# Patient Record
Sex: FEMALE | Race: BLACK OR AFRICAN AMERICAN
[De-identification: names, ages, dates, MRNs, and addresses within clinical notes are randomized per-mention and may not be internally consistent; named-entity substitution may affect disease eponyms.]

---

## 2017-08-24 ENCOUNTER — HOSPITAL ENCOUNTER (EMERGENCY)
Dept: HOSPITAL 61 - ER | Age: 52
Discharge: HOME | End: 2017-08-24
Payer: SELF-PAY

## 2017-08-24 VITALS — WEIGHT: 200 LBS | BODY MASS INDEX: 31.39 KG/M2 | HEIGHT: 67 IN

## 2017-08-24 VITALS — SYSTOLIC BLOOD PRESSURE: 163 MMHG | DIASTOLIC BLOOD PRESSURE: 94 MMHG

## 2017-08-24 DIAGNOSIS — M54.41: ICD-10-CM

## 2017-08-24 DIAGNOSIS — F12.10: ICD-10-CM

## 2017-08-24 DIAGNOSIS — M54.42: Primary | ICD-10-CM

## 2017-08-24 PROCEDURE — 99284 EMERGENCY DEPT VISIT MOD MDM: CPT

## 2017-08-24 NOTE — PHYS DOC
Past Medical History


Past Medical History:  No Pertinent History


Past Surgical History:  No Surgical History


Alcohol Use:  Occasionally


Drug Use:  Marijuana





Adult General


Chief Complaint


Chief Complaint:  BACK PAIN - NO INJURY





HPI


HPI





Patient is a 51  year old female presenting with moderate bilateral low back 

pain that began this morning. Patient denies any known injury. She states the 

pain is radiating to bilateral hips. Patient denies any loss of bowel/ bladder 

function. Denies any numbness or tingling to bilateral lower extremities. 

Denies any urinary symptoms. Patient states she has no PCP, she also states she 

has no previous medical history.





Review of Systems


Review of Systems





Constitutional: Denies fever or chills []


GI: Denies abdominal pain, nausea, vomiting, bloody stools or diarrhea []


: Denies dysuria or hematuria []


Musculoskeletal: Low back pain


Integument: Denies rash or skin lesions []


Neurologic: Denies headache, focal weakness or sensory changes []





Current Medications


Current Medications





Current Medications








 Medications


  (Trade)  Dose


 Ordered  Sig/Dany  Start Time


 Stop Time Status Last Admin


Dose Admin


 


 Acetaminophen/


 Hydrocodone Bitart


  (Lortab 5/325)  2 tab  1X  ONCE  8/24/17 18:45


 8/24/17 18:46   


 


 


 Cyclobenzaprine


 HCl


  (Flexeril)  10 mg  1X  ONCE  8/24/17 18:45


 8/24/17 18:46   


 


 


 Naproxen


  (Naprosyn)  500 mg  1X  STAT  8/24/17 18:01


 8/24/17 18:19 DC  


 











Allergies


Allergies





Allergies








Coded Allergies Type Severity Reaction Last Updated Verified


 


  No Known Drug Allergies    8/24/17 No











Physical Exam


Physical Exam





Constitutional: Well developed, well nourished, no acute distress, non-toxic 

appearance. []


Abdomen: Bowel sounds normal, soft, no tenderness, no masses, no pulsatile 

masses. [] 


Skin: Warm, dry, no erythema, no rash. [] 


Back: No tenderness, no CVA tenderness. [] 


Extremities: Diffuse paraspinal muscle tenderness to bilateral lumbar spine, no 

midline lumbar spine tenderness, no cyanosis, no clubbing, ROM intact, no 

edema. [] 


Neurologic: Alert and oriented X 3, normal motor function, normal sensory 

function, no focal deficits noted. []


Psychologic: Affect normal, judgement normal, mood normal. []





Current Patient Data


Vital Signs





 Vital Signs








  Date Time  Temp Pulse Resp B/P (MAP) Pulse Ox O2 Delivery O2 Flow Rate FiO2


 


8/24/17 17:49 97.6 70 18  97 Room Air  





 97.6       











EKG


EKG


[]





Radiology/Procedures


Radiology/Procedures


[]





Course & Med Decision Making


Course & Med Decision Making


Pertinent Labs and Imaging studies reviewed. (See chart for details)





Patient is in the ED with complaints of bilateral low back pain that began today

, no known injury. Pain appears musculoskeletal. Discharged with Ultram and 

Robaxin. Follow-up with PCP in 1-2 weeks from the list provided. She has no 

symptoms Cauda equina syndrome.





Dragon Disclaimer


Dragon Disclaimer


This electronic medical record was generated, in whole or in part, using a 

voice recognition dictation system.





Departure


Departure


Impression:  


 Primary Impression:  


 Back pain


 Additional Impressions:  


 Sciatica of right side


 Sciatica of left side


Disposition:  01 HOME, SELF-CARE


Condition:  STABLE


Referrals:  


NO PCP (PCP)


follow up with a doctor from the list provided in one week


Patient Instructions:  Back Pain, Adult, Sciatica, Easy-to-Read





Additional Instructions:  


You were seen for low back pain. Apply heat to your low back. Establish care 

with a doctor from the list provided and follow-up in the next 7 days. Avoid 

lifting any heavy items especially anything greater than a gallon of milk for 

one week. Take the prescribed medicines as ordered. Do not drive or operate 

machinery on any of the pain medications.


Scripts


Tramadol Hcl (ULTRAM) 50 Mg Tablet


1 TAB PO Q6HRS, #30 TAB


   Prov: PAVAN WEST         8/24/17 


Methocarbamol (ROBAXIN) 500 Mg Tablet


1 TAB PO TID, #30 TAB


   Prov: PAVAN WEST         8/24/17





Problem Qualifiers








 Primary Impression:  


 Back pain


 Back pain location:  low back pain  Chronicity:  acute  Back pain laterality:  

bilateral  Sciatica presence:  with sciatica  Sciatica laterality:  bilateral 

sciatica  Qualified Codes:  M54.42 - Lumbago with sciatica, left side; M54.41 - 

Lumbago with sciatica, right side








PAVAN WEST Aug 24, 2017 18:12

## 2018-01-22 ENCOUNTER — HOSPITAL ENCOUNTER (EMERGENCY)
Dept: HOSPITAL 61 - ER | Age: 53
Discharge: HOME | End: 2018-01-22
Payer: SELF-PAY
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
## 2018-01-22 DIAGNOSIS — F12.10: ICD-10-CM
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
## 2018-01-22 DIAGNOSIS — S89.91XA: Primary | ICD-10-CM

## 2018-01-22 PROCEDURE — 29505 APPLICATION LONG LEG SPLINT: CPT

## 2018-01-22 PROCEDURE — 99284 EMERGENCY DEPT VISIT MOD MDM: CPT

## 2018-01-22 PROCEDURE — 73562 X-RAY EXAM OF KNEE 3: CPT

## 2018-01-22 RX ADMIN — ACETAMINOPHEN 1 MG: 325 TABLET, FILM COATED ORAL at 20:51

## 2018-09-23 ENCOUNTER — HOSPITAL ENCOUNTER (EMERGENCY)
Dept: HOSPITAL 61 - ER | Age: 53
Discharge: HOME | End: 2018-09-23
Payer: SELF-PAY

## 2018-09-23 VITALS — HEIGHT: 66 IN | WEIGHT: 200 LBS | BODY MASS INDEX: 32.14 KG/M2

## 2018-09-23 VITALS — SYSTOLIC BLOOD PRESSURE: 142 MMHG | DIASTOLIC BLOOD PRESSURE: 80 MMHG
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
## 2018-09-23 DIAGNOSIS — S61.217A: Primary | ICD-10-CM
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
## 2018-09-23 PROCEDURE — 99282 EMERGENCY DEPT VISIT SF MDM: CPT

## 2018-09-23 PROCEDURE — 99283 EMERGENCY DEPT VISIT LOW MDM: CPT

## 2018-09-23 PROCEDURE — 12001 RPR S/N/AX/GEN/TRNK 2.5CM/<: CPT

## 2020-11-27 ENCOUNTER — HOSPITAL ENCOUNTER (EMERGENCY)
Dept: HOSPITAL 61 - ER | Age: 55
Discharge: HOME | End: 2020-11-27
Payer: SELF-PAY

## 2020-11-27 VITALS — SYSTOLIC BLOOD PRESSURE: 138 MMHG | DIASTOLIC BLOOD PRESSURE: 69 MMHG

## 2020-11-27 VITALS — WEIGHT: 200.4 LBS | HEIGHT: 66 IN | BODY MASS INDEX: 32.21 KG/M2
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
## 2020-11-27 DIAGNOSIS — S52.515A: Primary | ICD-10-CM

## 2020-11-27 DIAGNOSIS — F17.200: ICD-10-CM

## 2020-11-27 DIAGNOSIS — S52.615A: ICD-10-CM

## 2020-11-27 PROCEDURE — 73110 X-RAY EXAM OF WRIST: CPT

## 2020-11-27 PROCEDURE — 29125 APPL SHORT ARM SPLINT STATIC: CPT

## 2020-11-27 PROCEDURE — A4565 SLINGS: HCPCS

## 2020-11-27 PROCEDURE — 99284 EMERGENCY DEPT VISIT MOD MDM: CPT

## 2020-11-27 NOTE — PHYS DOC
Past Medical History


Past Medical History:  No Pertinent History


Past Surgical History:  No Surgical History


Smoking Status:  Current Every Day Smoker


Alcohol Use:  Occasionally


Drug Use:  Marijuana





General Adult


EDM:


Chief Complaint:  WRIST PAIN





HPI:


HPI:





Patient is a 55  year old female who presents with tripped and fell try to catch

herself with the left wrist 6 days ago.  She states since then she has had left 

wrist pain and increased swelling.  She states her only history of smoking.  

States she been taking Tylenol and ibuprofen for this.  Rates her pain an 8 out 

of 10 notes sharp and aching.





Review of Systems:


Review of Systems:


Constitutional:   Denies fever or chills. []


Eyes:   Denies change in visual acuity. []


HENT:   Denies nasal congestion or sore throat. [] 


Respiratory:   Denies cough or shortness of breath. [] 


Cardiovascular:   Denies chest pain or + left wrist 3+ edema. [] 


GI:   Denies abdominal pain, nausea, vomiting, bloody stools or diarrhea. [] 


:  Denies dysuria. [] 


Musculoskeletal:   Denies back pain.  +Left wrist joint pain. [] 


Integument:   Denies rash. [] 


Neurologic:   Denies headache, focal weakness or sensory changes. [] 


Endocrine:   Denies polyuria or polydipsia. [] 


Lymphatic:  Denies swollen glands. [] 


Psychiatric:  Denies depression or anxiety. []





Heart Score:


Risk Factors:


Risk Factors:  DM, Current or recent (<one month) smoker, HTN, HLP, family 

history of CAD, obesity.


Risk Scores:


Score 0 - 3:  2.5% MACE over next 6 weeks - Discharge Home


Score 4 - 6:  20.3% MACE over next 6 weeks - Admit for Clinical Observation


Score 7 - 10:  72.7% MACE over next 6 weeks - Early Invasive Strategies





Allergies:


Allergies:





Allergies








Coded Allergies Type Severity Reaction Last Updated Verified


 


  No Known Drug Allergies    17 No











Physical Exam:


PE:





Constitutional: Well developed, well nourished, no acute distress, non-toxic 

appearance. []


HENT: Normocephalic, atraumatic, bilateral external ears normal, oropharynx 

moist, no oral exudates, nose normal. []


Eyes: PERRLA, EOMI, conjunctiva normal, no discharge. [] 


Neck: Normal range of motion, no tenderness, supple, no stridor. [] 


Cardiovascular:Heart rate regular rhythm, no murmur []


Lungs & Thorax:  Bilateral breath sounds clear to auscultation []


Abdomen: Bowel sounds normal, soft, no tenderness, no masses, no pulsatile 

masses. [] 


Skin: Warm, dry, no erythema, no rash. [] 


Back: No tenderness, no CVA tenderness. [] 


Extremities: Left lateral, medial, posterior, anterior tenderness, no cyanosis, 

no clubbing, left wrist ROM mid intact, 2-3+ edema. [] 


Neurologic: Alert and oriented X 3, normal motor function, normal sensory 

function, no focal deficits noted. []


Psychologic: Affect normal, judgement normal, mood normal. []





Current Patient Data:


Vital Signs:





                                   Vital Signs








  Date Time  Temp Pulse Resp B/P (MAP) Pulse Ox O2 Delivery O2 Flow Rate FiO2


 


20 15:39 97.9 80 16 138/69 (92) 100 Room Air  





 97.9       











EKG:


EKG:


[]





Radiology/Procedures:


Radiology/Procedures:


[]


Impression:


                            [facility]


                    [address] [place] [address]


                                 [phone]


                                        


                                 IMAGING REPORT





                                     Signed





PATIENT: DA HINES   ACCOUNT: [id number]     MRN#: [id number]


: 1965           LOCATION: ER              AGE: 55


SEX: F                    EXAM DT: 20         ACCESSION#: [id number]


STATUS: REG ER            ORD. PHYSICIAN: JOE SHEA


REASON: pain and swelling after a fall


PROCEDURE: WRIST 3V LEFT





 


WRIST 3V LEFT


 


Clinical Indication: Reason: pain and swelling after a fall / Spl. 


Instructions:  / History: 


 


Comparison: None.


 


Findings: 


There is acute traumatic nondisplaced fracture of the radial styloid. 


There is acute traumatic nondisplaced fracture at the tip of the ulnar 


styloid. The carpal bones are intact. The joint spaces are maintained. 


There is mild dorsal soft tissue swelling of the wrist. No radiopaque 


foreign body.


 


IMPRESSION:


Acute traumatic nondisplaced fractures of the radial and ulnar styloids.


 


Electronically signed by: Saran Carbone MD (2020 4:45 PM) [facility]














DICTATED and SIGNED BY:     SARAN CARBONE MD


DATE:     20 [id number]





Course & Med Decision Making:


Course & Med Decision Making


Pertinent Labs and Imaging studies reviewed. (See chart for details)





See HPI.  Limited range of motion in the left wrist due to pain and swelling.  

Tenderness to the medial, lateral, posterior, anterior wrist.  Patient can make 

a light fist.  She can wiggle her fingers.  Denies any numbness or tingling or 

skin color changes.  There is no bruising seen.  Cap refills less than 2 seco

nds.  Radial pulses strong and present.  Wrist is 2-3+ minutes.





IMPRESSION:


Acute traumatic nondisplaced fractures of the radial and ulnar styloids.





Patient to be placed in a sugar tong and follow-up with orthopedics.





***Splint assessment: Neurovascularly intact post splint replacement with good 

fit.





Patient's extremity symptoms have stabilized well they have been evaluated in 

the department and are appropriate for outpatient follow-up.  No evidence of 

compartment syndrome, neurologic injury, vascular injury, open joint, open 

fracture, tendon laceration, or foreign body.











[]





Dragon Disclaimer:


Dragon Disclaimer:


This electronic medical record was generated, in whole or in part, using a voice

 recognition dictation system.





Departure


Departure


Impression:  


   Primary Impression:  


   Radial styloid fracture


   Qualified Codes:  S52.516A - Nondisplaced fracture of unspecified radial 

   styloid process, initial encounter for closed fracture


   Additional Impression:  


   Fracture of styloid process of left ulna


   Qualified Codes:  S52.615A - Nondisplaced fracture of left ulna styloid 

   process, initial encounter for closed fracture


Disposition:  01 DC HOME SELF CARE/HOMELESS


Condition:  STABLE


Referrals:  


NO PCP (PCP)








JOSÉ ANTONIO HARMON MD


Patient Instructions:  Radial Fracture, Ulnar Fracture





Additional Instructions:  


Follow-up with orthopedic as soon as possible.  Take medication as prescribed 

earlier and the medication will make you sleepy.  Do not drive or drink alcohol 

with the medication.  Do not take more Tylenol with this medication.  Use ice 

and elevation to help with pain.


Scripts


Hydrocodone/Apap 5-325 (NORCO 5-325 TABLET) 1 Each Tablet


1 TAB PO PRN Q6HRS PRN for PAIN, #12 TAB 0 Refills


   Prov: JOE SHEA         20











JOE SHEA            2020 16:58

## 2020-11-27 NOTE — RAD
WRIST 3V LEFT

 

Clinical Indication: Reason: pain and swelling after a fall / Spl. 

Instructions:  / History: 

 

Comparison: None.

 

Findings: 

There is acute traumatic nondisplaced fracture of the radial styloid. 

There is acute traumatic nondisplaced fracture at the tip of the ulnar 

styloid. The carpal bones are intact. The joint spaces are maintained. 

There is mild dorsal soft tissue swelling of the wrist. No radiopaque 

foreign body.

 

IMPRESSION:

Acute traumatic nondisplaced fractures of the radial and ulnar styloids.

 

Electronically signed by: Saran Carbone MD (11/27/2020 4:45 PM) [place]

## 2021-04-06 ENCOUNTER — HOSPITAL ENCOUNTER (EMERGENCY)
Dept: HOSPITAL 61 - ER | Age: 56
Discharge: HOME | End: 2021-04-06
Payer: SELF-PAY

## 2021-04-06 VITALS — SYSTOLIC BLOOD PRESSURE: 166 MMHG | DIASTOLIC BLOOD PRESSURE: 81 MMHG

## 2021-04-06 VITALS — WEIGHT: 189.6 LBS | HEIGHT: 66 IN | BODY MASS INDEX: 30.47 KG/M2

## 2021-04-06 DIAGNOSIS — Z20.822: ICD-10-CM

## 2021-04-06 DIAGNOSIS — R05: ICD-10-CM

## 2021-04-06 DIAGNOSIS — R09.89: Primary | ICD-10-CM

## 2021-04-06 DIAGNOSIS — R06.02: ICD-10-CM

## 2021-04-06 DIAGNOSIS — F17.200: ICD-10-CM

## 2021-04-06 PROCEDURE — 93005 ELECTROCARDIOGRAM TRACING: CPT

## 2021-04-06 PROCEDURE — 71045 X-RAY EXAM CHEST 1 VIEW: CPT

## 2021-04-06 PROCEDURE — C9803 HOPD COVID-19 SPEC COLLECT: HCPCS

## 2021-04-06 PROCEDURE — 99285 EMERGENCY DEPT VISIT HI MDM: CPT

## 2021-04-06 PROCEDURE — U0003 INFECTIOUS AGENT DETECTION BY NUCLEIC ACID (DNA OR RNA); SEVERE ACUTE RESPIRATORY SYNDROME CORONAVIRUS 2 (SARS-COV-2) (CORONAVIRUS DISEASE [COVID-19]), AMPLIFIED PROBE TECHNIQUE, MAKING USE OF HIGH THROUGHPUT TECHNOLOGIES AS DESCRIBED BY CMS-2020-01-R: HCPCS

## 2021-04-06 NOTE — RAD
XR CHEST 1V



Clinical History: Reason: SOA, cough, chest pressure / Spl. Instructions:  / History: 



Technique:  AP view of the chest was obtained at 4/6/2021 5:47 PM.



Comparison: None.



Findings:

The cardiomediastinal silhouette is normal. The pulmonary vasculature is normal. The lungs and pleura
l margins are clear.



Impression:  



No evidence of an acute cardiopulmonary process.



Electronically signed by: Rick Avila III, MD (4/6/2021 5:54 PM) [facility]

## 2021-04-06 NOTE — ED.ADGEN
Past Medical History


Past Medical History:  No Pertinent History


Past Surgical History:  No Surgical History


Smoking Status:  Current Every Day Smoker


Alcohol Use:  Occasionally


Drug Use:  Marijuana





General Adult


EDM:


Chief Complaint:  SHORTNESS OF BREATH





HPI:


HPI:





Patient is a 55  year old AA female who presents to the emergency department 

with complaints of a dry cough, shortness of breath, and chest congestion since 

yesterday.  She states she is also had some runny nose and nasal congestion.  

She denies any fever, sore throat, nausea, vomiting, diarrhea, body aches, or 

dizziness.  Patient states she has felt generalized fatigue.  She denies any 

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 

have been exposed to someone with COVID-19.  She denies any loss of taste or 

smell.  Patient currently rates her discomfort 8 out of 10 on the pain scale she

describes it as a chest tightness.  She denies any alleviating or exacerbating 

factors.  She denies any significant medical history.





Review of Systems:


Review of Systems:


Complete ROS is negative unless otherwise noted in HPI.





Allergies:


Allergies:





Allergies








Coded Allergies Type Severity Reaction Last Updated Verified


 


  No Known Drug Allergies    17 No











Physical Exam:


PE:


See Above


Constitutional: Well developed, well nourished, no acute distress, non-toxic 

appearance, overweight. []


HENT: Normocephalic, atraumatic, bilateral external ears normal, nose normal. []


Eyes: PERRLA, EOMI, conjunctiva normal, no discharge. [] 


Neck: Normal range of motion, no stridor. [] 


Cardiovascular:Heart rate regular rhythm


Lungs & Thorax:  Respirations even and unlabored, no retractions, no respiratory

 distress, lungs CTA with occasional expiratory wheeze


Abdomen: soft, no tenderness


Skin: Warm, dry, no erythema, no rash. [] 


Extremities: No cyanosis, ROM intact, no edema. [] 


Neurologic: Alert and oriented X 3, normal motor, sensory, no focal deficits 

noted. []


Psychologic: Affect normal, judgement normal, mood normal. []





Current Patient Data:


Vital Signs:





                                   Vital Signs








  Date Time  Temp Pulse Resp B/P (MAP) Pulse Ox O2 Delivery O2 Flow Rate FiO2


 


21 16:55 98.4 87 16 159/79 (105) 97 Room Air  





 98.4       











EKG:


EK-sinus rhythm, EKG, rate 84, no STEMI, read by Dr. Proctor []





Heart Score:


C/O Chest Pain:  No


HEART Score for Chest Pain:  








HEART Score for Chest Pain Response (Comments) Value


 


History Slighlty/Non-Suspicious 0


 


ECG Normal 0


 


Age >45 - < 65 1


 


Risk Factors                            1 or 2 Risk Factors 1


 


Total  2








Risk Factors:


Risk Factors:  DM, Current or recent (<one month) smoker, HTN, HLP, family 

history of CAD, obesity.


Risk Scores:


Score 0 - 3:  2.5% MACE over next 6 weeks - Discharge Home


Score 4 - 6:  20.3% MACE over next 6 weeks - Admit for Clinical Observation


Score 7 - 10:  72.7% MACE over next 6 weeks - Early Invasive Strategies





Radiology/Procedures:


Radiology/Procedures:


PROCEDURE: PORTABLE CHEST 1V





XR CHEST 1V





Clinical History: Reason: SOA, cough, chest pressure / Spl. Instructions:  / H

istory: 





Technique:  AP view of the chest was obtained at 2021 5:47 PM.





Comparison: None.





Findings:


The cardiomediastinal silhouette is normal. The pulmonary vasculature is normal.

 The lungs and pleural margins are clear.





Impression:  





No evidence of an acute cardiopulmonary process.


[]





Course & Med Decision Making:


Course & Med Decision Making


Pertinent Labs and Imaging studies reviewed. (See chart for details)


55-year-old female presents emergency department with complaints of shortness of

 breath, chest congestion, and concerns of COVID-19 infection.





Chest x-ray reveals no acute findings.  EKG is normal with no acute changes.  

Patient's vital signs are stable in the emergency department, COVID-19 test is 

pending.  Patient was given COVID-19 quarantine and care instructions





I encouraged patient to return to the ER if her symptoms worsened or fever 

develop that did not respond to Tylenol or ibuprofen.





Patient verbalized an understanding of home care, medications, follow-up, and 

return to ED instructions and was in agreement with the plan of care.





Dragon Disclaimer:


Dragon Disclaimer:


This electronic medical record was generated, in whole or in part, using a voice

 recognition dictation system.





Departure


Departure


Impression:  


   Primary Impression:  


   Person under investigation for COVID-19


   Additional Impression:  


   Chest congestion


Disposition:  01 DC HOME SELF CARE/HOMELESS


Condition:  STABLE


Referrals:  


NO PCP (PCP)


Patient Instructions:  Upper Respiratory Infection, Adult, Easy-to-Read





Additional Instructions:  


You have been tested for or diagnosed with COVID-19. It is an infection caused 

by a new type 


of coronavirus. COVID-19 will cause cold-like or mild flu symptoms in most. It 

can cause 


more severe symptoms like problems breathing in some.





There is no treatment for COVID-19. The body will clear the infection over time.

 Self-care 


will help to ease discomfort.





Steps to Take:


Self-Care


Rest as needed. Healthy habits may help you feel better. Steps include:





Choose healthy foods including fruits and vegetables. Drink water throughout the

 day.


Get plenty of sleep each night.


If you smoke, try to quit. It may ease breathing.


Avoid alcohol.


Keep Others Healthy


The virus can spread to others. Droplets are released every time you sneeze or 

cough. The 


droplets can get into the mouth, nose, or eyes of people near you and lead to 

infection. To 


lower the chances of spreading COVID-19 to others:





Stay at home until your doctor has said it is safe to leave. If you tested 

positive this 


will mean staying isolated until both of the following are true:





At least 7 days have passed since the start of illness.


You are free of fever for at least 72 hours without the use of medicine.


During this time:





 - Avoid public areas, events, or transportation. Do not return to work or 

school until your 


doctor has said it is safe to do so.


 - Call ahead if you need to go to a medical center. Let them know you may have 

COVID-19. It 


will help them guide you where to go. They may also ask you to wear a facemask 

when you come 


to the office.


 - If you call for emergency medical services, let them know you may have COVID-

19.


While at home:





 - Try to avoid close contact with others. Stay about 6 feet away.


 - If possible, spend most of your time in a separate room from others.


 - Use a face mask if you will be in close contact with others such as sharing a

 room or 


vehicle.


 - Have someone wipe down common surfaces in the home. Use household  

every day on 


areas like doorknobs, counters, or sinks.


 - Cough or sneeze into a tissue. Throw the tissue away right after use. If a 

tissue is not 


available, cough or sneeze into your elbow.


 - Wash your hands often. Wash them after sneezing or coughing. Use soap and 

water and wash 


for at least 20 seconds. Alcohol based hand  can be used if soap and 

water is not 


available.


 - Do not prepare food for others. Avoid sharing personal items like forks, 

spoons, or 


toothbrushes.


 - Avoid close contact with pets while you are sick. There is no evidence of the

 virus 


passing to pets. This is a safety step until more is known about this virus.


Isolation can be frustrating. Social interaction can help. Keep in touch with 

friends and 


family through phone and tech options. You can still interact with others in 

your home, just 


keep a safe distance of about 6 feet.





Follow-up:


Your doctors office will check in with you to see if there are any changes in 

your health. 


You may be asked to keep track of symptoms to share with them. They will also 

let you know 


when you are clear to be in public again.





Problems to Look Out For:


Contact your doctor if your recovery is not going as you expect. Get emergency 

care if you 


have problems such as:





 - Trouble breathing


 - Nonstop chest pain or pressure


 - Changes in awareness, confusion, or problems waking


 - Lips or face have bluish color


 - Worsening of symptoms


If you think you have an emergency, call for emergency medical services right 

away.





As taken from [facility]


[address] [place]  [address]


[phone]





[facility]


[address] [place]  [address]


[phone]





[facility]


[address]


[place]  [address]


[phone]





[facility]


[address]


[place]  [address]


[phone]





[facility]


[address] [place]  [address]


[phone]





[facility]


[address] [place]  [address]


[phone]





[facility]


[address]


[place]  [address]


[phone]





[facility]


[address]


[place]  [address]


[phone]





[facility] 


[address]


[place]  [address]


[phone]





[facility]


[address] [place]  [address]


[phone]





Problem Qualifiers











ALFREDO JOINER APRBRENDA        2021 18:44

## 2021-04-06 NOTE — EKG
[facility]

              [address] [place] [address]

Test Date:    2021               Test Time:    16:58:22

Pat Name:     DA HINES             Department:   

Patient ID:   [id number]           Room:          

Gender:       F                        Technician:   SC

:          1965               Requested By: ERNESTO SAAVEDRA

Order Number: [id number]           Reading MD:     

                                 Measurements

Intervals                              Axis          

Rate:         84                       P:            43

KY:           158                      QRS:          12

QRSD:         80                       T:            34

QT:           362                                    

QTc:          431                                    

                           Interpretive Statements

SINUS RHYTHM

NORMAL ECG

RI6.02

No previous ECG available for comparison

## 2021-04-08 NOTE — NUR
IP: Attempted to contact pt concerning COVID results. Number provided is not in service. 
Call daughter's number. She provided pt number as [phone]. Called with no answer, left 
a voicemail to return the call.

## 2021-07-07 ENCOUNTER — HOSPITAL ENCOUNTER (EMERGENCY)
Dept: HOSPITAL 61 - ER | Age: 56
Discharge: HOME | End: 2021-07-07
Payer: SELF-PAY

## 2021-07-07 VITALS — HEIGHT: 66 IN | BODY MASS INDEX: 32.24 KG/M2 | WEIGHT: 200.62 LBS

## 2021-07-07 VITALS — DIASTOLIC BLOOD PRESSURE: 86 MMHG | SYSTOLIC BLOOD PRESSURE: 160 MMHG

## 2021-07-07 DIAGNOSIS — J40: Primary | ICD-10-CM

## 2021-07-07 DIAGNOSIS — F17.200: ICD-10-CM

## 2021-07-07 PROCEDURE — 99283 EMERGENCY DEPT VISIT LOW MDM: CPT

## 2021-07-07 PROCEDURE — 71046 X-RAY EXAM CHEST 2 VIEWS: CPT

## 2021-07-07 NOTE — ED.ADGEN
Past Medical History


Past Medical History:  No Pertinent History


Past Surgical History:  No Surgical History


Smoking Status:  Current Every Day Smoker


Alcohol Use:  Occasionally


Drug Use:  Marijuana





General Adult


EDM:


Chief Complaint:  SHORTNESS OF BREATH





HPI:


HPI:





Patient is a 55  year old [f__sex] who presents with []





Review of Systems:


Review of Systems:


Constitutional:   Denies fever or chills. []


Eyes:   Denies change in visual acuity. []


HENT:   Denies nasal congestion or sore throat. [] 


Respiratory:   Denies cough or shortness of breath. [] 


Cardiovascular:   Denies chest pain or edema. [] 


GI:   Denies abdominal pain, nausea, vomiting, bloody stools or diarrhea. [] 


:  Denies dysuria. [] 


Musculoskeletal:   Denies back pain or joint pain. [] 


Integument:   Denies rash. [] 


Neurologic:   Denies headache, focal weakness or sensory changes. [] 


Endocrine:   Denies polyuria or polydipsia. [] 


Lymphatic:  Denies swollen glands. [] 


Psychiatric:  Denies depression or anxiety. []





Allergies:


Allergies:





Allergies








Coded Allergies Type Severity Reaction Last Updated Verified


 


  No Known Drug Allergies    8/24/17 No











Physical Exam:


PE:





Constitutional: Well developed, well nourished, no acute distress, non-toxic 

appearance. []


HENT: Normocephalic, atraumatic, bilateral external ears normal, oropharynx 

moist, no oral exudates, nose normal. []


Eyes: PERRLA, EOMI, conjunctiva normal, no discharge. [] 


Neck: Normal range of motion, no tenderness, supple, no stridor. [] 


Cardiovascular:Heart rate regular rhythm, no murmur []


Lungs & Thorax:  Bilateral breath sounds clear to auscultation []


Abdomen: Bowel sounds normal, soft, no tenderness, no masses, no pulsatile 

masses. [] 


Skin: Warm, dry, no erythema, no rash. [] 


Back: No tenderness, no CVA tenderness. [] 


Extremities: No tenderness, no cyanosis, no clubbing, ROM intact, no edema. [] 


Neurologic: Alert and oriented X 3, normal motor function, normal sensory 

function, no focal deficits noted. []


Psychologic: Affect normal, judgement normal, mood normal. []





Current Patient Data:


Vital Signs:





                                   Vital Signs








  Date Time  Temp Pulse Resp B/P (MAP) Pulse Ox O2 Delivery O2 Flow Rate FiO2


 


7/7/21 20:58 97.7 86 16 160/86 (109) 100 Room Air  





 97.7       











EKG:


EKG:


[]





Heart Score:


Risk Factors:


Risk Factors:  DM, Current or recent (<one month) smoker, HTN, HLP, family 

history of CAD, obesity.


Risk Scores:


Score 0 - 3:  2.5% MACE over next 6 weeks - Discharge Home


Score 4 - 6:  20.3% MACE over next 6 weeks - Admit for Clinical Observation


Score 7 - 10:  72.7% MACE over next 6 weeks - Early Invasive Strategies





Radiology/Procedures:


Radiology/Procedures:


[]





Course & Med Decision Making:


Course & Med Decision Making


Pertinent Labs and Imaging studies reviewed. (See chart for details)





[]





Dragon Disclaimer:


Dragon Disclaimer:


This electronic medical record was generated, in whole or in part, using a voice

 recognition dictation system.





Departure


Departure


Impression:  


   Primary Impression:  


   Bronchitis


Disposition:  01 HOME / SELF CARE / HOMELESS


Condition:  STABLE


Referrals:  


NO PCP (PCP)


Patient Instructions:  Acute Bronchitis, Form - Return To Work


Scripts


Albuterol Sulfate (VENTOLIN HFA INHALER) 18 Gm Hfa.aer.ad


2 PUFF INH QID for FOR ASTHMA, #1 INHALER 0 Refills


   Prov: JOHN CAMPBELL MD         7/7/21 


Prednisone (PREDNISONE) 50 Mg Tablet


1 TAB PO DAILY, #5 TAB


   Prov: JOHN CAMPBELL MD         7/7/21











JOHN CAMPBELL MD             Jul 7, 2021 21:55

## 2021-07-07 NOTE — RAD
EXAM: PA and Lateral Views of the Chest



DATE: 7/7/2021 9:36 PM



INDICATION: Reason: sob / Spl. Instructions:  / History: 



COMPARISON: 4/6/2021



FINDINGS:

The heart is not enlarged.



Mediastinal and hilar contours are normal.



No focal parenchymal airspace opacity.



No pleural effusion or pneumothorax.



IMPRESSION:

1.  No radiographic evidence for acute cardiopulmonary process.



Electronically signed by: Antony Kamara MD (7/7/2021 9:51 PM) [facility]

## 2021-09-06 ENCOUNTER — HOSPITAL ENCOUNTER (EMERGENCY)
Dept: HOSPITAL 61 - ER | Age: 56
LOS: 1 days | Discharge: HOME | End: 2021-09-07
Payer: COMMERCIAL

## 2021-09-06 VITALS — BODY MASS INDEX: 31.45 KG/M2 | HEIGHT: 67 IN | WEIGHT: 200.4 LBS

## 2021-09-06 VITALS — DIASTOLIC BLOOD PRESSURE: 72 MMHG | SYSTOLIC BLOOD PRESSURE: 130 MMHG

## 2021-09-06 DIAGNOSIS — F17.200: ICD-10-CM

## 2021-09-06 DIAGNOSIS — U07.1: Primary | ICD-10-CM

## 2021-09-06 LAB
ALBUMIN SERPL-MCNC: 3.2 G/DL (ref 3.4–5)
ALBUMIN/GLOB SERPL: 0.8 {RATIO} (ref 1–1.7)
ALP SERPL-CCNC: 78 U/L (ref 46–116)
ALT SERPL-CCNC: 23 U/L (ref 14–59)
ANION GAP SERPL CALC-SCNC: 11 MMOL/L (ref 6–14)
AST SERPL-CCNC: 19 U/L (ref 15–37)
BASOPHILS # BLD AUTO: 0.1 X10^3/UL (ref 0–0.2)
BASOPHILS NFR BLD: 1 % (ref 0–3)
BILIRUB SERPL-MCNC: 0.2 MG/DL (ref 0.2–1)
BUN SERPL-MCNC: 8 MG/DL (ref 7–20)
BUN/CREAT SERPL: 7 (ref 6–20)
CALCIUM SERPL-MCNC: 8.6 MG/DL (ref 8.5–10.1)
CHLORIDE SERPL-SCNC: 105 MMOL/L (ref 98–107)
CO2 SERPL-SCNC: 26 MMOL/L (ref 21–32)
CREAT SERPL-MCNC: 1.2 MG/DL (ref 0.6–1)
EOSINOPHIL NFR BLD: 0 X10^3/UL (ref 0–0.7)
EOSINOPHIL NFR BLD: 1 % (ref 0–3)
ERYTHROCYTE [DISTWIDTH] IN BLOOD BY AUTOMATED COUNT: 15.6 % (ref 11.5–14.5)
GFR SERPLBLD BASED ON 1.73 SQ M-ARVRAT: 56.2 ML/MIN
GLUCOSE SERPL-MCNC: 98 MG/DL (ref 70–99)
HCT VFR BLD CALC: 38.5 % (ref 36–47)
HGB BLD-MCNC: 13.2 G/DL (ref 12–15.5)
INFLUENZA A PATIENT: NEGATIVE
INFLUENZA B PATIENT: NEGATIVE
LYMPHOCYTES # BLD: 1.1 X10^3/UL (ref 1–4.8)
LYMPHOCYTES NFR BLD AUTO: 23 % (ref 24–48)
MCH RBC QN AUTO: 26 PG (ref 25–35)
MCHC RBC AUTO-ENTMCNC: 34 G/DL (ref 31–37)
MCV RBC AUTO: 77 FL (ref 79–100)
MONO #: 1 X10^3/UL (ref 0–1.1)
MONOCYTES NFR BLD: 20 % (ref 0–9)
NEUT #: 2.7 X10^3/UL (ref 1.8–7.7)
NEUTROPHILS NFR BLD AUTO: 55 % (ref 31–73)
PLATELET # BLD AUTO: 233 X10^3/UL (ref 140–400)
POTASSIUM SERPL-SCNC: 3.9 MMOL/L (ref 3.5–5.1)
PROT SERPL-MCNC: 7.1 G/DL (ref 6.4–8.2)
RBC # BLD AUTO: 5.02 X10^6/UL (ref 3.5–5.4)
SODIUM SERPL-SCNC: 142 MMOL/L (ref 136–145)
WBC # BLD AUTO: 4.9 X10^3/UL (ref 4–11)

## 2021-09-06 PROCEDURE — 87426 SARSCOV CORONAVIRUS AG IA: CPT

## 2021-09-06 PROCEDURE — 71045 X-RAY EXAM CHEST 1 VIEW: CPT

## 2021-09-06 PROCEDURE — 80053 COMPREHEN METABOLIC PANEL: CPT

## 2021-09-06 PROCEDURE — 85007 BL SMEAR W/DIFF WBC COUNT: CPT

## 2021-09-06 PROCEDURE — 93005 ELECTROCARDIOGRAM TRACING: CPT

## 2021-09-06 PROCEDURE — 99285 EMERGENCY DEPT VISIT HI MDM: CPT

## 2021-09-06 PROCEDURE — 84484 ASSAY OF TROPONIN QUANT: CPT

## 2021-09-06 PROCEDURE — 96374 THER/PROPH/DIAG INJ IV PUSH: CPT

## 2021-09-06 PROCEDURE — 85025 COMPLETE CBC W/AUTO DIFF WBC: CPT

## 2021-09-06 PROCEDURE — 83880 ASSAY OF NATRIURETIC PEPTIDE: CPT

## 2021-09-06 PROCEDURE — 87804 INFLUENZA ASSAY W/OPTIC: CPT

## 2021-09-06 PROCEDURE — 36415 COLL VENOUS BLD VENIPUNCTURE: CPT

## 2021-09-06 PROCEDURE — 96361 HYDRATE IV INFUSION ADD-ON: CPT

## 2021-09-06 PROCEDURE — 96375 TX/PRO/DX INJ NEW DRUG ADDON: CPT

## 2021-09-06 RX ADMIN — ACETAMINOPHEN ONE MG: 500 TABLET ORAL at 21:45

## 2021-09-06 RX ADMIN — ACETAMINOPHEN ONE MG: 500 TABLET ORAL at 22:22

## 2021-09-06 NOTE — PHYS DOC
Past Medical History


Past Medical History:  No Pertinent History


Past Surgical History:  No Surgical History


Smoking Status:  Current Every Day Smoker


Alcohol Use:  Occasionally


Drug Use:  Marijuana





General Adult


EDM:


Chief Complaint:  NAUSEA/VOMITING/DIARRHEA





HPI:


HPI:





Patient is a 55  year old female who presents with headache, body aches, 

shortness of air, cough, nausea x.  Has not gotten vaccinated for Covid.  Denies

any history and denies any taking any medications.  She has not taken any 

Tylenol or ibuprofen since yesterday.  She is a smoker.  Denies vomiting, 

diarrhea, chest pain, dizziness, syncope, vision change, numbness tingling, 

focal weakness, back pain.  Rating her generalized pain 8 out of 10.





Review of Systems:


Review of Systems:


Constitutional:  +fever or +chills. []


Eyes:   Denies change in visual acuity. []


HENT:   Denies nasal congestion or sore throat. [] 


Respiratory:   + cough or +shortness of breath. [] 


Cardiovascular:   Denies chest pain or edema. [] 


GI:   Denies abdominal pain, +nausea, denies vomiting, bloody stools or 

diarrhea. [] 


:  Denies dysuria. [] 


Musculoskeletal:   Denies back pain or joint pain. + Generalized body aches [] 


Integument:   Denies rash. [] 


Neurologic:   +headache, denies focal weakness or sensory changes. [] 


Endocrine:   Denies polyuria or polydipsia. [] 


Lymphatic:  Denies swollen glands. [] 


Psychiatric:  Denies depression or anxiety. []





Heart Score:


C/O Chest Pain:  No


HEART Score for Chest Pain:  








HEART Score for Chest Pain Response (Comments) Value


 


History Slighlty/Non-Suspicious 0


 


ECG Normal 0


 


Age >45 - < 65 1


 


Risk Factors                            1 or 2 Risk Factors 1


 


Troponin < Normal Limit 0


 


Total  2








Risk Factors:


Risk Factors:  DM, Current or recent (<one month) smoker, HTN, HLP, family 

history of CAD, obesity.


Risk Scores:


Score 0 - 3:  2.5% MACE over next 6 weeks - Discharge Home


Score 4 - 6:  20.3% MACE over next 6 weeks - Admit for Clinical Observation


Score 7 - 10:  72.7% MACE over next 6 weeks - Early Invasive Strategies





Current Medications:





Current Medications








 Medications


  (Trade)  Dose


 Ordered  Sig/Dany  Start Time


 Stop Time Status Last Admin


Dose Admin


 


 Acetaminophen


  (Tylenol)  1,000 mg  1X  ONCE  21 21:45


 21 21:47 DC  





 


 Dexamethasone


 Sodium Phosphate


  (Decadron)  10 mg  1X  ONCE  21 21:45


 21 21:47 DC  





 


 Ondansetron HCl


  (Zofran)  4 mg  1X  ONCE  21 21:45


 21 21:47 DC  





 


 Sodium Chloride  1,000 ml @ 


 1,000 mls/hr  Q1H  21 21:45


 21 22:44   














Allergies:


Allergies:





Allergies








Coded Allergies Type Severity Reaction Last Updated Verified


 


  No Known Drug Allergies    17 No











Physical Exam:


PE:





Constitutional: Well developed, well nourished, no acute distress, non-toxic 

appearance. []


HENT: Normocephalic, atraumatic, bilateral external ears normal, oropharynx 

moist, no oral exudates, nose normal. []


Eyes: PERRLA, EOMI, conjunctiva normal, no discharge. [] 


Neck: Normal range of motion, no tenderness, supple, no stridor. [] 


Cardiovascular:Heart rate regular rhythm, no murmur []


Lungs & Thorax:  Bilateral breath sounds clear to auscultation []


Abdomen: Bowel sounds normal, soft, no tenderness, no masses, no pulsatile ma

sses. [] 


Skin: Warm, dry, no erythema, no rash. [] 


Back: No tenderness, no CVA tenderness. [] 


Extremities: No tenderness, no cyanosis, no clubbing, ROM intact, no edema. [] 


Neurologic: Alert and oriented X 3, normal motor function, normal sensory 

function, no focal deficits noted. []


Psychologic: Affect normal, judgement normal, mood normal. []





**Normal physical exam





Current Patient Data:


Vital Signs:





                                   Vital Signs








  Date Time  Temp Pulse Resp B/P (MAP) Pulse Ox O2 Delivery O2 Flow Rate FiO2


 


21 20:59 100.9 96  176/99 (109) 97 Room Air  





 100.9       











EKG:


EK and read by Dr. Che as sinus rhythm and no STEMI





Radiology/Procedures:


Radiology/Procedures:


[]


Impression:


                            [facility]


                    [address] [place] [address] [phone]


                                        


                                 IMAGING REPORT





                                     Signed





PATIENT: DA HINES   ACCOUNT: [id number]     MRN#: [id number]


: 1965           LOCATION: ER              AGE: 55


SEX: F                    EXAM DT: 21         ACCESSION#: [id number]


STATUS: REG ER            ORD. PHYSICIAN: JOE SHEA


REASON: SOA, COUGH, FEVER


PROCEDURE: PORTABLE CHEST 1V





Exam: Chest one view





INDICATION: Short of air, cough, fever





TECHNIQUE: Frontal view of the chest





Comparisons: 2021





FINDINGS:


The cardiomediastinal silhouette and pulmonary vessels are within normal limits.





The lung and pleural spaces are clear.





IMPRESSION:


No acute cardiopulmonary process.





Electronically signed by: Pam Ayala MD (2021 10:07 PM) [facility]














DICTATED and SIGNED BY:     PAM AYALA MD


DATE:     21 [id number] 0





Course & Med Decision Making:


Course & Med Decision Making


Pertinent Labs and Imaging studies reviewed. (See chart for details)





COVID-19 CRITERIA:    The patient was evaluated during the global COVID-19 pan

demic, and that diagnosis was suspected/considered upon their initial 

presentation.  Their evaluation, treatment and testing was consistent with 

current guidelines for patients who present with complaints or symptoms that may

 be related to COVID-19.





See HPI.  Alert and oriented x4.  Ambulatory steady gait.  Skin pink warm and 

dry.  Lungs are clear to auscultation all lobes.  Vital signs are within normal 

limits.  She is febrile.  She is given Tylenol, fluids and dexamethasone in the 

ED.





Patient is positive for Covid.  She is stable and in no respiratory distress.  

She is hemodynamically stable.











[]





Dragon Disclaimer:


Dragon Disclaimer:


This electronic medical record was generated, in whole or in part, using a voice

 recognition dictation system.





COVID-19 Patient Risks:


Age 65 or older:  No


Sign of co-morbidity:  Yes


Exp to person + for COVID:  No


Exp to PUI:  No


Travel from affected area:  No


Lower respiratory symptoms:  Yes


Fever:  Yes


Other:  Yes (nausea)





PPE Use:


Full PPE with N95 mask or PAPR:  Yes





Departure


Departure


Impression:  


   Primary Impression:  


   COVID-19


Disposition:  01 HOME / SELF CARE / HOMELESS


Condition:  STABLE


Referrals:  


NO PCP (PCP)


Patient Instructions:  Cough, Adult, Fever, Adult





Additional Instructions:  


Follow-up with primary care provider if needed.  Rest.  Drink plenty of fluids. 

 Take medications as prescribed and with food.  If you begin having severe 

shortness of breath, chest pain or you cannot keep down fluids return emergency 

room.


You have been tested for or diagnosed with COVID-19. It is an infection caused 

by a new type 


of coronavirus. COVID-19 will cause cold-like or mild flu symptoms in most. It 

can cause 


more severe symptoms like problems breathing in some.





There is no treatment for COVID-19. The body will clear the infection over time.

 Self-care 


will help to ease discomfort.





Steps to Take:


Self-Care


Rest as needed. Healthy habits may help you feel better. Steps include:





Choose healthy foods including fruits and vegetables. Drink water throughout the

 day.


Get plenty of sleep each night.


If you smoke, try to quit. It may ease breathing.


Avoid alcohol.


Keep Others Healthy


The virus can spread to others. Droplets are released every time you sneeze or 

cough. The 


droplets can get into the mouth, nose, or eyes of people near you and lead to 

infection. To 


lower the chances of spreading COVID-19 to others:





Stay at home until your doctor has said it is safe to leave. If you tested 

positive this 


will mean staying isolated until both of the following are true:





At least 7 days have passed since the start of illness.


You are free of fever for at least 72 hours without the use of medicine.


During this time:





 - Avoid public areas, events, or transportation. Do not return to work or 

school until your 


doctor has said it is safe to do so.


 - Call ahead if you need to go to a medical center. Let them know you may have 

COVID-19. It 


will help them guide you where to go. They may also ask you to wear a facemask 

when you come 


to the office.


 - If you call for emergency medical services, let them know you may have COVID-

19.


While at home:





 - Try to avoid close contact with others. Stay about 6 feet away.


 - If possible, spend most of your time in a separate room from others.


 - Use a face mask if you will be in close contact with others such as sharing a

 room or 


vehicle.


 - Have someone wipe down common surfaces in the home. Use household  

every day on 


areas like doorknobs, counters, or sinks.


 - Cough or sneeze into a tissue. Throw the tissue away right after use. If a 

tissue is not 


available, cough or sneeze into your elbow.


 - Wash your hands often. Wash them after sneezing or coughing. Use soap and 

water and wash 


for at least 20 seconds. Alcohol based hand  can be used if soap and 

water is not 


available.


 - Do not prepare food for others. Avoid sharing personal items like forks, 

spoons, or 


toothbrushes.


 - Avoid close contact with pets while you are sick. There is no evidence of the

 virus 


passing to pets. This is a safety step until more is known about this virus.


Isolation can be frustrating. Social interaction can help. Keep in touch with 

friends and 


family through phone and tech options. You can still interact with others in 

your home, just 


keep a safe distance of about 6 feet.





Follow-up:


Your doctors office will check in with you to see if there are any changes in 

your health. 


You may be asked to keep track of symptoms to share with them. They will also 

let you know 


when you are clear to be in public again.





Problems to Look Out For:


Contact your doctor if your recovery is not going as you expect. Get emergency 

care if you 


have problems such as:





 - Trouble breathing


 - Nonstop chest pain or pressure


 - Changes in awareness, confusion, or problems waking


 - Lips or face have bluish color


 - Worsening of symptoms


If you think you have an emergency, call for emergency medical services right 

away.





As taken from [facility]


Scripts


Albuterol Sulfate (PROAIR HFA INHALER) 8.5 Gm [email or web address].ad


1 PUFF INH PRN Q6HRS PRN for SHORTNESS OF BREATH, #1 EACH 0 Refills


   Prov: JOE SHEA APRN         21 


Methylprednisolone (MEDROL) 4 Mg Tab.ds.pk


1 PKG PO UD, #1 PKG


   Prov: JOE SHEA APRN         21 


Ondansetron (ONDANSETRON ODT) 4 Mg Tab.rapdis


1 TAB PO PRN Q6-8HRS, #16 TAB


   Prov: JOE SHEA APRN         21











JOE SHEA APRN             Sep 6, 2021 21:55

## 2021-09-06 NOTE — RAD
Exam: Chest one view



INDICATION: Short of air, cough, fever



TECHNIQUE: Frontal view of the chest



Comparisons: 7/7/2021



FINDINGS:

The cardiomediastinal silhouette and pulmonary vessels are within normal limits.



The lung and pleural spaces are clear.



IMPRESSION:

No acute cardiopulmonary process.



Electronically signed by: Pam Soni MD (9/6/2021 10:07 PM) [facility]

## 2021-09-07 LAB
% BANDS: 2 % (ref 0–9)
% LYMPHS: 15 % (ref 24–48)
% MONOS: 16 % (ref 0–10)
% SEGS: 65 % (ref 35–66)
BASOPHILS NFR BLD AUTO: 1 % (ref 0–3)
EOSINOPHIL NFR BLD AUTO: 1 % (ref 0–5)
PLATELET # BLD EST: ADEQUATE 10*3/UL